# Patient Record
Sex: MALE | Race: WHITE | NOT HISPANIC OR LATINO | Employment: OTHER | URBAN - NONMETROPOLITAN AREA
[De-identification: names, ages, dates, MRNs, and addresses within clinical notes are randomized per-mention and may not be internally consistent; named-entity substitution may affect disease eponyms.]

---

## 2019-12-30 ENCOUNTER — HOSPITAL ENCOUNTER (EMERGENCY)
Facility: HOSPITAL | Age: 59
Discharge: HOME | End: 2019-12-30
Attending: EMERGENCY MEDICINE | Admitting: EMERGENCY MEDICINE
Payer: COMMERCIAL

## 2019-12-30 ENCOUNTER — OFFICE VISIT (OUTPATIENT)
Dept: URGENT CARE | Facility: CLINIC | Age: 59
End: 2019-12-30
Payer: COMMERCIAL

## 2019-12-30 ENCOUNTER — APPOINTMENT (EMERGENCY)
Dept: RADIOLOGY | Facility: HOSPITAL | Age: 59
End: 2019-12-30
Payer: COMMERCIAL

## 2019-12-30 VITALS
DIASTOLIC BLOOD PRESSURE: 90 MMHG | OXYGEN SATURATION: 98 % | WEIGHT: 213 LBS | TEMPERATURE: 98 F | HEIGHT: 72 IN | SYSTOLIC BLOOD PRESSURE: 153 MMHG | BODY MASS INDEX: 28.85 KG/M2 | HEART RATE: 86 BPM | RESPIRATION RATE: 18 BRPM

## 2019-12-30 VITALS
TEMPERATURE: 97.8 F | RESPIRATION RATE: 21 BRPM | DIASTOLIC BLOOD PRESSURE: 82 MMHG | WEIGHT: 223.33 LBS | SYSTOLIC BLOOD PRESSURE: 153 MMHG | BODY MASS INDEX: 30.29 KG/M2 | OXYGEN SATURATION: 94 % | HEART RATE: 81 BPM

## 2019-12-30 DIAGNOSIS — R07.9 CHEST PAIN, UNSPECIFIED TYPE: Primary | ICD-10-CM

## 2019-12-30 LAB
ALBUMIN SERPL BCP-MCNC: 3.7 G/DL (ref 3.5–5)
ALP SERPL-CCNC: 68 U/L (ref 46–116)
ALT SERPL W P-5'-P-CCNC: 36 U/L (ref 12–78)
ANION GAP SERPL CALCULATED.3IONS-SCNC: 6 MMOL/L (ref 4–13)
AST SERPL W P-5'-P-CCNC: 21 U/L (ref 5–45)
BASOPHILS # BLD AUTO: 0.05 THOUSANDS/ΜL (ref 0–0.1)
BASOPHILS NFR BLD AUTO: 1 % (ref 0–1)
BILIRUB SERPL-MCNC: 0.75 MG/DL (ref 0.2–1)
BUN SERPL-MCNC: 12 MG/DL (ref 5–25)
CALCIUM SERPL-MCNC: 8.7 MG/DL (ref 8.3–10.1)
CHLORIDE SERPL-SCNC: 103 MMOL/L (ref 100–108)
CO2 SERPL-SCNC: 30 MMOL/L (ref 21–32)
CREAT SERPL-MCNC: 1.13 MG/DL (ref 0.6–1.3)
EOSINOPHIL # BLD AUTO: 0.33 THOUSAND/ΜL (ref 0–0.61)
EOSINOPHIL NFR BLD AUTO: 4 % (ref 0–6)
ERYTHROCYTE [DISTWIDTH] IN BLOOD BY AUTOMATED COUNT: 12.9 % (ref 11.6–15.1)
GFR SERPL CREATININE-BSD FRML MDRD: 70 ML/MIN/1.73SQ M
GLUCOSE SERPL-MCNC: 124 MG/DL (ref 65–140)
HCT VFR BLD AUTO: 48.4 % (ref 36.5–49.3)
HGB BLD-MCNC: 16.5 G/DL (ref 12–17)
IMM GRANULOCYTES # BLD AUTO: 0.04 THOUSAND/UL (ref 0–0.2)
IMM GRANULOCYTES NFR BLD AUTO: 1 % (ref 0–2)
LYMPHOCYTES # BLD AUTO: 1.52 THOUSANDS/ΜL (ref 0.6–4.47)
LYMPHOCYTES NFR BLD AUTO: 18 % (ref 14–44)
MCH RBC QN AUTO: 31.7 PG (ref 26.8–34.3)
MCHC RBC AUTO-ENTMCNC: 34.1 G/DL (ref 31.4–37.4)
MCV RBC AUTO: 93 FL (ref 82–98)
MONOCYTES # BLD AUTO: 0.63 THOUSAND/ΜL (ref 0.17–1.22)
MONOCYTES NFR BLD AUTO: 8 % (ref 4–12)
NEUTROPHILS # BLD AUTO: 5.75 THOUSANDS/ΜL (ref 1.85–7.62)
NEUTS SEG NFR BLD AUTO: 68 % (ref 43–75)
NRBC BLD AUTO-RTO: 0 /100 WBCS
PLATELET # BLD AUTO: 187 THOUSANDS/UL (ref 149–390)
PMV BLD AUTO: 8.8 FL (ref 8.9–12.7)
POTASSIUM SERPL-SCNC: 3.7 MMOL/L (ref 3.5–5.3)
PROT SERPL-MCNC: 7.9 G/DL (ref 6.4–8.2)
RBC # BLD AUTO: 5.2 MILLION/UL (ref 3.88–5.62)
SODIUM SERPL-SCNC: 139 MMOL/L (ref 136–145)
TROPONIN I SERPL-MCNC: <0.02 NG/ML
WBC # BLD AUTO: 8.32 THOUSAND/UL (ref 4.31–10.16)

## 2019-12-30 PROCEDURE — 36415 COLL VENOUS BLD VENIPUNCTURE: CPT | Performed by: EMERGENCY MEDICINE

## 2019-12-30 PROCEDURE — 71046 X-RAY EXAM CHEST 2 VIEWS: CPT

## 2019-12-30 PROCEDURE — 93005 ELECTROCARDIOGRAM TRACING: CPT | Performed by: PHYSICIAN ASSISTANT

## 2019-12-30 PROCEDURE — 99213 OFFICE O/P EST LOW 20 MIN: CPT | Performed by: PHYSICIAN ASSISTANT

## 2019-12-30 PROCEDURE — 99285 EMERGENCY DEPT VISIT HI MDM: CPT

## 2019-12-30 PROCEDURE — 93005 ELECTROCARDIOGRAM TRACING: CPT

## 2019-12-30 PROCEDURE — 80053 COMPREHEN METABOLIC PANEL: CPT | Performed by: EMERGENCY MEDICINE

## 2019-12-30 PROCEDURE — 85025 COMPLETE CBC W/AUTO DIFF WBC: CPT | Performed by: EMERGENCY MEDICINE

## 2019-12-30 PROCEDURE — 84484 ASSAY OF TROPONIN QUANT: CPT | Performed by: EMERGENCY MEDICINE

## 2019-12-30 PROCEDURE — 99285 EMERGENCY DEPT VISIT HI MDM: CPT | Performed by: EMERGENCY MEDICINE

## 2019-12-30 RX ORDER — SIMVASTATIN 40 MG
40 TABLET ORAL DAILY
Refills: 3 | COMMUNITY
Start: 2019-12-15

## 2019-12-30 RX ORDER — MELOXICAM 15 MG/1
15 TABLET ORAL AS NEEDED
COMMUNITY

## 2019-12-30 RX ORDER — ACETAMINOPHEN 325 MG/1
650 TABLET ORAL EVERY 6 HOURS PRN
COMMUNITY

## 2019-12-30 RX ORDER — ASPIRIN 81 MG/1
81 TABLET, CHEWABLE ORAL ONCE
Status: COMPLETED | OUTPATIENT
Start: 2019-12-30 | End: 2019-12-30

## 2019-12-30 RX ADMIN — ASPIRIN 81 MG 81 MG: 81 TABLET ORAL at 21:12

## 2019-12-30 NOTE — PROGRESS NOTES
[address] [facility]        NAME: Siria Herrera is a 62 y o  male  : 1960    MRN: [id number]  DATE: 2019  TIME: 6:28 PM    Assessment and Plan   Chest pain, unspecified type [R07 9]  1  Chest pain, unspecified type  Transfer to other facility       EKG normal sinus  Patient Instructions       Follow up with PCP in 3-5 days  Proceed to  ER     Chief Complaint     Chief Complaint   Patient presents with    Chest Pain     piercing right anterior intermittently for the past 3 days- last for a few seconds every half hour  No sob or sweatiness  Started with cold s/s 1 week ago         History of Present Illness       Admits to URI sx with occasional cough x 1 week  Chest Pain    This is a new problem  Episode onset: 3d  The onset quality is sudden  The problem occurs intermittently  The problem has been gradually worsening  The pain is present in the lateral region (R side)  The pain is at a severity of 5/10  The quality of the pain is described as sharp  Radiates to: upper abdomen; ear; R shoulder  Associated symptoms include a cough  Pertinent negatives include no diaphoresis, dizziness, exertional chest pressure, fever, headaches, hemoptysis, irregular heartbeat, leg pain, lower extremity edema, malaise/fatigue, nausea, numbness, palpitations, shortness of breath, vomiting or weakness  The cough is productive  Associated with: movement  Treatments tried: laying down  Risk factors include male gender  His past medical history is significant for hyperlipidemia  Pertinent negatives for past medical history include no aneurysm, no anxiety/panic attacks, no aortic aneurysm, no arrhythmia, no CAD, no COPD, no CHF, no diabetes, no DVT, no hypertension, no MI, no PE, no recent injury, no seizures, no spontaneous pneumothorax, no stimulant use, no strokes, no thyroid problem, no TIA and no valve disorder     His family medical history is significant for heart disease, hyperlipidemia, hypertension (father), early MI (brothers- 46s) and sudden death (AAA-father)  Pertinent negatives for family medical history include: no CAD, no diabetes, no PE, no stroke and no TIA  Prior diagnostic workup includes exercise treadmill test and echocardiogram (cardiac workup - about 25 years ago for chest pain)  Review of Systems   Review of Systems   Constitutional: Negative for activity change, appetite change, chills, diaphoresis, fatigue, fever and malaise/fatigue  Respiratory: Positive for cough  Negative for hemoptysis, chest tightness and shortness of breath  Cardiovascular: Positive for chest pain  Negative for palpitations and leg swelling  Gastrointestinal: Negative for abdominal distention, anal bleeding, blood in stool, constipation, diarrhea, nausea and vomiting  Musculoskeletal: Negative for myalgias  Skin: Negative for color change and rash  Neurological: Negative for dizziness, seizures, facial asymmetry, weakness, light-headedness, numbness and headaches  Psychiatric/Behavioral: Negative for confusion  The patient is not nervous/anxious            Current Medications       Current Outpatient Medications:     acetaminophen (TYLENOL) 325 mg tablet, Take 650 mg by mouth every 6 (six) hours as needed for mild pain, Disp: , Rfl:     meloxicam (MOBIC) 15 mg tablet, Take 15 mg by mouth as needed, Disp: , Rfl:     simvastatin (ZOCOR) 40 mg tablet, Take 40 mg by mouth daily, Disp: , Rfl: 3    Current Allergies     Allergies as of 12/30/2019    (No Known Allergies)            The following portions of the patient's history were reviewed and updated as appropriate: allergies, current medications, past family history, past medical history, past social history, past surgical history and problem list      Past Medical History:   Diagnosis Date    High cholesterol        Past Surgical History:   Procedure Laterality Date    HERNIA REPAIR      PROSTATE SURGERY      VASECTOMY Family History   Problem Relation Age of Onset    Cancer Mother     Stroke Mother     Abdominal aortic aneurysm Father          Medications have been verified  Objective   /90   Pulse 86   Temp 98 °F (36 7 °C) (Tympanic)   Resp 18   Ht 6' (1 829 m)   Wt 96 6 kg (213 lb)   SpO2 98%   BMI 28 89 kg/m²        Physical Exam     Physical Exam   Constitutional: He is oriented to person, place, and time  He appears well-developed and well-nourished  No distress  HENT:   Head: Normocephalic  Right Ear: External ear normal    Left Ear: External ear normal    Nose: Nose normal    Mouth/Throat: Oropharynx is clear and moist  No oropharyngeal exudate  Eyes: Conjunctivae are normal    Cardiovascular: Normal rate, regular rhythm, normal heart sounds and intact distal pulses  Exam reveals no gallop and no friction rub  No murmur heard  Pulmonary/Chest: Effort normal and breath sounds normal  No respiratory distress  He has no wheezes  He has no rhonchi  He has no rales  He exhibits no tenderness  Abdominal: Soft  There is no tenderness  Lymphadenopathy:     He has no cervical adenopathy  Neurological: He is alert and oriented to person, place, and time  Skin: Skin is warm  He is not diaphoretic  Psychiatric: He has a normal mood and affect  His behavior is normal  Judgment and thought content normal    Vitals reviewed

## 2019-12-31 LAB
ATRIAL RATE: 79 BPM
P AXIS: 2 DEGREES
PR INTERVAL: 126 MS
QRS AXIS: -26 DEGREES
QRSD INTERVAL: 86 MS
QT INTERVAL: 366 MS
QTC INTERVAL: 419 MS
T WAVE AXIS: 27 DEGREES
VENTRICULAR RATE: 79 BPM

## 2019-12-31 PROCEDURE — 93010 ELECTROCARDIOGRAM REPORT: CPT | Performed by: INTERNAL MEDICINE

## 2019-12-31 NOTE — ED PROVIDER NOTES
History  Chief Complaint   Patient presents with    Chest Pain     pt states R sided chest pain intermittent for the past few days  +productive cough  pt denies SOB, N/V, diaphoresis  pt also c/o R ear ache     Patient is a 76-year-old male with a history of high cholesterol, sent to the emergency department from urgent care for complaints of chest pain x3 days, patient denies any shortness of breath, no aggravating or alleviating factors for the chest pain, no diaphoresis, he does report a cough which is nonproductive in nature and an earache, patient has no known coronary artery disease but has a strong family history          Prior to Admission Medications   Prescriptions Last Dose Informant Patient Reported? Taking?   acetaminophen (TYLENOL) 325 mg tablet   Yes No   Sig: Take 650 mg by mouth every 6 (six) hours as needed for mild pain   meloxicam (MOBIC) 15 mg tablet 12/30/2019 at Unknown time  Yes Yes   Sig: Take 15 mg by mouth as needed   simvastatin (ZOCOR) 40 mg tablet 12/30/2019 at Unknown time  Yes Yes   Sig: Take 40 mg by mouth daily      Facility-Administered Medications: None       Past Medical History:   Diagnosis Date    High cholesterol        Past Surgical History:   Procedure Laterality Date    HERNIA REPAIR      PROSTATE SURGERY      VASECTOMY         Family History   Problem Relation Age of Onset    Cancer Mother     Stroke Mother     Abdominal aortic aneurysm Father      I have reviewed and agree with the history as documented  Social History     Tobacco Use    Smoking status: Former Smoker    Smokeless tobacco: Never Used   Substance Use Topics    Alcohol use: Never     Frequency: Never    Drug use: Never        Review of Systems   Constitutional: Negative  Negative for diaphoresis  HENT: Positive for ear pain  Eyes: Negative  Respiratory: Negative  Negative for shortness of breath  Cardiovascular: Positive for chest pain  Gastrointestinal: Negative      Endocrine: Negative  Genitourinary: Negative  Musculoskeletal: Negative  Skin: Negative  Allergic/Immunologic: Negative  Neurological: Negative  Hematological: Negative  Psychiatric/Behavioral: Negative  Physical Exam  Physical Exam   Constitutional: He is oriented to person, place, and time  Vital signs are normal  He appears well-developed and well-nourished  He is active  HENT:   Head: Normocephalic and atraumatic  Eyes: Pupils are equal, round, and reactive to light  Conjunctivae, EOM and lids are normal    Neck: Trachea normal and normal range of motion  Neck supple  Cardiovascular: Normal rate and regular rhythm  Pulmonary/Chest: Effort normal and breath sounds normal    Abdominal: Soft  Normal appearance and bowel sounds are normal    Musculoskeletal: Normal range of motion  Neurological: He is alert and oriented to person, place, and time  He has normal strength  No cranial nerve deficit or sensory deficit  Skin: Skin is warm and dry  Capillary refill takes less than 2 seconds  Psychiatric: He has a normal mood and affect   His behavior is normal  Thought content normal        Vital Signs  ED Triage Vitals [12/30/19 2052]   Temperature Pulse Respirations Blood Pressure SpO2   97 8 °F (36 6 °C) 95 18 (!) 173/90 98 %      Temp Source Heart Rate Source Patient Position - Orthostatic VS BP Location FiO2 (%)   Oral Monitor Lying Right arm --      Pain Score       5           Vitals:    12/30/19 2052 12/30/19 2100   BP: (!) 173/90 153/82   Pulse: 95 100   Patient Position - Orthostatic VS: Lying              ED Medications  Medications   aspirin chewable tablet 81 mg (81 mg Oral Given 12/30/19 2112)       Diagnostic Studies  Results Reviewed     Procedure Component Value Units Date/Time    Troponin I [[id number]]  (Normal) Collected:  12/30/19 2115    Lab Status:  Final result Specimen:  Blood from Arm, Left Updated:  12/30/19 2146     Troponin I <0 02 ng/mL     Comprehensive metabolic panel [[id number]] Collected:  12/30/19 2115    Lab Status:  Final result Specimen:  Blood from Arm, Left Updated:  12/30/19 2143     Sodium 139 mmol/L      Potassium 3 7 mmol/L      Chloride 103 mmol/L      CO2 30 mmol/L      ANION GAP 6 mmol/L      BUN 12 mg/dL      Creatinine 1 13 mg/dL      Glucose 124 mg/dL      Calcium 8 7 mg/dL      AST 21 U/L      ALT 36 U/L      Alkaline Phosphatase 68 U/L      Total Protein 7 9 g/dL      Albumin 3 7 g/dL      Total Bilirubin 0 75 mg/dL      eGFR 70 ml/min/1 73sq m     Narrative:       National Kid[employer] guidelines for Chronic Kidney Disease (CKD):     Stage 1 with normal or high GFR (GFR > 90 mL/min/1 73 square meters)    Stage 2 Mild CKD (GFR = 60-89 mL/min/1 73 square meters)    Stage 3A Moderate CKD (GFR = 45-59 mL/min/1 73 square meters)    Stage 3B Moderate CKD (GFR = 30-44 mL/min/1 73 square meters)    Stage 4 Severe CKD (GFR = 15-29 mL/min/1 73 square meters)    Stage 5 End Stage CKD (GFR <15 mL/min/1 73 square meters)  Note: GFR calculation is accurate only with a steady state creatinine    CBC and differential [[id number]]  (Abnormal) Collected:  12/30/19 2115    Lab Status:  Final result Specimen:  Blood from Arm, Left Updated:  12/30/19 2124     WBC 8 32 Thousand/uL      RBC 5 20 Million/uL      Hemoglobin 16 5 g/dL      Hematocrit 48 4 %      MCV 93 fL      MCH 31 7 pg      MCHC 34 1 g/dL      RDW 12 9 %      MPV 8 8 fL      Platelets 209 Thousands/uL      nRBC 0 /100 WBCs      Neutrophils Relative 68 %      Immat GRANS % 1 %      Lymphocytes Relative 18 %      Monocytes Relative 8 %      Eosinophils Relative 4 %      Basophils Relative 1 %      Neutrophils Absolute 5 75 Thousands/µL      Immature Grans Absolute 0 04 Thousand/uL      Lymphocytes Absolute 1 52 Thousands/µL      Monocytes Absolute 0 63 Thousand/µL      Eosinophils Absolute 0 33 Thousand/µL      Basophils Absolute 0 05 Thousands/µL                  XR chest pa & lateral   Final Result by Leo Haddad MD (12/30 2206)      No acute cardiopulmonary disease  Workstation performed: [id number]                    Procedures  ECG 12 Lead Documentation Only  Date/Time: 12/30/2019 9:41 PM  Performed by: Lorna Phan DO  Authorized by: Lorna Phan DO     Indications / Diagnosis:  Chest pain  ECG reviewed by me, the ED Provider: yes    Patient location:  ED  Previous ECG:     Comparison to cardiac monitor: Yes    Interpretation:     Interpretation: normal    Rate:     ECG rate:  87    ECG rate assessment: normal    Rhythm:     Rhythm: sinus rhythm    Ectopy:     Ectopy: none    QRS:     QRS axis:  Left    QRS intervals:  Normal  Conduction:     Conduction: normal    ST segments:     ST segments:  Normal  T waves:     T waves: normal               ED Course  ED Course as of Dec 30 2217   Mon Dec 30, 2019   2215 Lab and imaging findings unremarkable and discussed with patient bedside, symptoms have been present for 3 days, certainly if pain was cardiac in nature patient's troponin would be elevated at this time, pain is on the right side of the chest as well and not exacerbated by activity, he does also report some viral upper respiratory symptoms over the past few days, more likely the cause of his symptoms, however patient was advised to follow up with his primary care provider in 2-3 days or return if symptoms worsen, patient acknowledges understanding and agreement with this                                      Disposition  Final diagnoses:   Chest pain, unspecified type     Time reflects when diagnosis was documented in both MDM as applicable and the Disposition within this note     Time User Action Codes Description Comment    12/30/2019 10:11 PM Catina Garcia Add [R07 9] Chest pain, unspecified type       ED Disposition     ED Disposition Condition Date/Time Comment    Discharge Stable Mon Dec 30, 2019 10:11 PM Laura Otero discharge to home/self care              Follow-up Information     Follow up With Specialties Details Why Contact Info    Waqar Altman DO  In 2 days  [address]  [facility]  [phone]            Patient's Medications   Discharge Prescriptions    No medications on file     No discharge procedures on file      ED Provider  Electronically Signed by           Libertad Timmons DO  12/30/19 6090

## 2020-01-14 LAB
ATRIAL RATE: 87 BPM
P AXIS: 29 DEGREES
PR INTERVAL: 134 MS
QRS AXIS: -44 DEGREES
QRSD INTERVAL: 90 MS
QT INTERVAL: 350 MS
QTC INTERVAL: 421 MS
T WAVE AXIS: 73 DEGREES
VENTRICULAR RATE: 87 BPM